# Patient Record
Sex: FEMALE | Race: WHITE | URBAN - METROPOLITAN AREA
[De-identification: names, ages, dates, MRNs, and addresses within clinical notes are randomized per-mention and may not be internally consistent; named-entity substitution may affect disease eponyms.]

---

## 2021-10-01 ENCOUNTER — OFFICE VISIT (OUTPATIENT)
Dept: URBAN - METROPOLITAN AREA CLINIC 23 | Facility: CLINIC | Age: 51
End: 2021-10-01
Payer: COMMERCIAL

## 2021-10-01 DIAGNOSIS — H53.19 OTHER SUBJECTIVE VISUAL DISTURBANCES: Primary | ICD-10-CM

## 2021-10-01 DIAGNOSIS — H46.02 OPTIC PAPILLITIS, LEFT EYE: ICD-10-CM

## 2021-10-01 PROCEDURE — 99204 OFFICE O/P NEW MOD 45 MIN: CPT | Performed by: OPTOMETRIST

## 2021-10-01 ASSESSMENT — KERATOMETRY
OD: 45.13
OS: 45.38

## 2021-10-01 ASSESSMENT — INTRAOCULAR PRESSURE
OD: 16
OS: 16

## 2021-10-01 NOTE — IMPRESSION/PLAN
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 Plan: Optic nerve head appears raised. Patient has appt with neurologist this week. Patient needs to be checked for increased spinal fluid pressure.

## 2021-10-01 NOTE — IMPRESSION/PLAN
Impression: Other subjective visual disturbances: H53.19. Plan: Discussed condition with patient. No treatment necessary at this time. No pathology seen in retina. Optic nerve appears slightly raised today. Recommended patient keep appointment with neurology. Continue to monitor.

## 2021-10-07 ENCOUNTER — OFFICE VISIT (OUTPATIENT)
Dept: URBAN - METROPOLITAN AREA CLINIC 23 | Facility: CLINIC | Age: 51
End: 2021-10-07
Payer: COMMERCIAL

## 2021-10-07 PROCEDURE — 99213 OFFICE O/P EST LOW 20 MIN: CPT | Performed by: OPTOMETRIST

## 2021-10-07 ASSESSMENT — KERATOMETRY
OS: 46.50
OD: 45.75

## 2021-10-07 ASSESSMENT — INTRAOCULAR PRESSURE
OD: 16
OS: 17

## 2021-10-07 ASSESSMENT — VISUAL ACUITY
OD: 20/20
OS: 20/25

## 2021-10-07 NOTE — IMPRESSION/PLAN
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 Plan: pt edu on all findings. Both optic nerves appear raised today with worsening vision OS only. Pt will rtc for onh oct and VF next week to see extent of vision loss. Pt is finding new neurologist to rule out MS, IIH, pseudotumor, etc.  
Will send all notes and testing to neuro.

## 2021-10-18 ENCOUNTER — OFFICE VISIT (OUTPATIENT)
Dept: URBAN - METROPOLITAN AREA CLINIC 23 | Facility: CLINIC | Age: 51
End: 2021-10-18
Payer: COMMERCIAL

## 2021-10-18 DIAGNOSIS — H46.03 OPTIC PAPILLITIS, BILATERAL: Primary | ICD-10-CM

## 2021-10-18 PROCEDURE — 92083 EXTENDED VISUAL FIELD XM: CPT | Performed by: OPTOMETRIST

## 2021-10-18 PROCEDURE — 99212 OFFICE O/P EST SF 10 MIN: CPT | Performed by: OPTOMETRIST

## 2021-10-18 ASSESSMENT — INTRAOCULAR PRESSURE
OD: 16
OS: 17

## 2021-10-18 NOTE — IMPRESSION/PLAN
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 Plan: VF today. Pt will take notes to neuro and f/u with them.